# Patient Record
Sex: MALE | Race: WHITE | Employment: OTHER | URBAN - METROPOLITAN AREA
[De-identification: names, ages, dates, MRNs, and addresses within clinical notes are randomized per-mention and may not be internally consistent; named-entity substitution may affect disease eponyms.]

---

## 2025-07-31 ENCOUNTER — APPOINTMENT (OUTPATIENT)
Dept: RADIOLOGY | Facility: HOSPITAL | Age: 80
End: 2025-07-31
Payer: MEDICARE

## 2025-07-31 ENCOUNTER — HOSPITAL ENCOUNTER (OUTPATIENT)
Dept: CARDIOLOGY | Facility: HOSPITAL | Age: 80
Discharge: HOME | End: 2025-07-31
Payer: MEDICARE

## 2025-07-31 ENCOUNTER — HOSPITAL ENCOUNTER (EMERGENCY)
Facility: HOSPITAL | Age: 80
Discharge: HOME | End: 2025-07-31
Payer: MEDICARE

## 2025-07-31 ENCOUNTER — APPOINTMENT (OUTPATIENT)
Dept: CARDIOLOGY | Facility: HOSPITAL | Age: 80
End: 2025-07-31
Payer: MEDICARE

## 2025-07-31 VITALS
RESPIRATION RATE: 18 BRPM | SYSTOLIC BLOOD PRESSURE: 144 MMHG | DIASTOLIC BLOOD PRESSURE: 87 MMHG | WEIGHT: 200 LBS | OXYGEN SATURATION: 94 % | HEIGHT: 65 IN | TEMPERATURE: 97.7 F | HEART RATE: 61 BPM | BODY MASS INDEX: 33.32 KG/M2

## 2025-07-31 DIAGNOSIS — R07.9 CHEST PAIN: ICD-10-CM

## 2025-07-31 DIAGNOSIS — K42.9 UMBILICAL HERNIA WITHOUT OBSTRUCTION AND WITHOUT GANGRENE: ICD-10-CM

## 2025-07-31 DIAGNOSIS — M10.9: ICD-10-CM

## 2025-07-31 DIAGNOSIS — I50.32 CHRONIC DIASTOLIC CONGESTIVE HEART FAILURE: Primary | ICD-10-CM

## 2025-07-31 LAB
ALBUMIN SERPL BCP-MCNC: 4.3 G/DL (ref 3.4–5)
ALP SERPL-CCNC: 87 U/L (ref 33–136)
ALT SERPL W P-5'-P-CCNC: 11 U/L (ref 10–52)
ANION GAP SERPL CALC-SCNC: 14 MMOL/L (ref 10–20)
AST SERPL W P-5'-P-CCNC: 12 U/L (ref 9–39)
BASOPHILS # BLD AUTO: 0.05 X10*3/UL (ref 0–0.1)
BASOPHILS NFR BLD AUTO: 0.6 %
BILIRUB SERPL-MCNC: 1.1 MG/DL (ref 0–1.2)
BNP SERPL-MCNC: 276 PG/ML (ref 0–99)
BUN SERPL-MCNC: 26 MG/DL (ref 6–23)
CALCIUM SERPL-MCNC: 9.6 MG/DL (ref 8.6–10.3)
CARDIAC TROPONIN I PNL SERPL HS: 11 NG/L (ref 0–20)
CHLORIDE SERPL-SCNC: 105 MMOL/L (ref 98–107)
CO2 SERPL-SCNC: 25 MMOL/L (ref 21–32)
CREAT SERPL-MCNC: 1.99 MG/DL (ref 0.5–1.3)
EGFRCR SERPLBLD CKD-EPI 2021: 33 ML/MIN/1.73M*2
EOSINOPHIL # BLD AUTO: 0.16 X10*3/UL (ref 0–0.4)
EOSINOPHIL NFR BLD AUTO: 2 %
ERYTHROCYTE [DISTWIDTH] IN BLOOD BY AUTOMATED COUNT: 14 % (ref 11.5–14.5)
GLUCOSE SERPL-MCNC: 88 MG/DL (ref 74–99)
HCT VFR BLD AUTO: 38.9 % (ref 41–52)
HGB BLD-MCNC: 12.6 G/DL (ref 13.5–17.5)
IMM GRANULOCYTES # BLD AUTO: 0.03 X10*3/UL (ref 0–0.5)
IMM GRANULOCYTES NFR BLD AUTO: 0.4 % (ref 0–0.9)
LYMPHOCYTES # BLD AUTO: 1.04 X10*3/UL (ref 0.8–3)
LYMPHOCYTES NFR BLD AUTO: 13.1 %
MCH RBC QN AUTO: 27.6 PG (ref 26–34)
MCHC RBC AUTO-ENTMCNC: 32.4 G/DL (ref 32–36)
MCV RBC AUTO: 85 FL (ref 80–100)
MONOCYTES # BLD AUTO: 1.05 X10*3/UL (ref 0.05–0.8)
MONOCYTES NFR BLD AUTO: 13.3 %
NEUTROPHILS # BLD AUTO: 5.59 X10*3/UL (ref 1.6–5.5)
NEUTROPHILS NFR BLD AUTO: 70.6 %
NRBC BLD-RTO: 0 /100 WBCS (ref 0–0)
PLATELET # BLD AUTO: 262 X10*3/UL (ref 150–450)
POTASSIUM SERPL-SCNC: 4 MMOL/L (ref 3.5–5.3)
PROT SERPL-MCNC: 7.4 G/DL (ref 6.4–8.2)
RBC # BLD AUTO: 4.56 X10*6/UL (ref 4.5–5.9)
SODIUM SERPL-SCNC: 140 MMOL/L (ref 136–145)
WBC # BLD AUTO: 7.9 X10*3/UL (ref 4.4–11.3)

## 2025-07-31 PROCEDURE — 99285 EMERGENCY DEPT VISIT HI MDM: CPT

## 2025-07-31 PROCEDURE — 80053 COMPREHEN METABOLIC PANEL: CPT

## 2025-07-31 PROCEDURE — 73110 X-RAY EXAM OF WRIST: CPT | Mod: LT

## 2025-07-31 PROCEDURE — 71045 X-RAY EXAM CHEST 1 VIEW: CPT | Performed by: RADIOLOGY

## 2025-07-31 PROCEDURE — 84484 ASSAY OF TROPONIN QUANT: CPT

## 2025-07-31 PROCEDURE — 73110 X-RAY EXAM OF WRIST: CPT | Mod: LEFT SIDE | Performed by: RADIOLOGY

## 2025-07-31 PROCEDURE — 93005 ELECTROCARDIOGRAM TRACING: CPT

## 2025-07-31 PROCEDURE — 71045 X-RAY EXAM CHEST 1 VIEW: CPT

## 2025-07-31 PROCEDURE — 85025 COMPLETE CBC W/AUTO DIFF WBC: CPT

## 2025-07-31 PROCEDURE — 83880 ASSAY OF NATRIURETIC PEPTIDE: CPT

## 2025-07-31 PROCEDURE — 36415 COLL VENOUS BLD VENIPUNCTURE: CPT

## 2025-07-31 RX ORDER — COLCHICINE 0.6 MG/1
0.6 TABLET ORAL 2 TIMES DAILY
Qty: 14 TABLET | Refills: 0 | Status: SHIPPED | OUTPATIENT
Start: 2025-07-31 | End: 2025-07-31

## 2025-07-31 RX ORDER — METHYLPREDNISOLONE 4 MG/1
TABLET ORAL
Qty: 21 TABLET | Refills: 0 | Status: SHIPPED | OUTPATIENT
Start: 2025-07-31 | End: 2025-08-06

## 2025-07-31 RX ORDER — FUROSEMIDE 20 MG/1
20 TABLET ORAL 2 TIMES DAILY
Qty: 14 TABLET | Refills: 0 | Status: SHIPPED | OUTPATIENT
Start: 2025-07-31 | End: 2025-08-07

## 2025-07-31 RX ORDER — FUROSEMIDE 20 MG/1
20 TABLET ORAL 2 TIMES DAILY
Qty: 14 TABLET | Refills: 0 | Status: SHIPPED | OUTPATIENT
Start: 2025-07-31 | End: 2025-07-31

## 2025-07-31 RX ORDER — COLCHICINE 0.6 MG/1
0.6 TABLET ORAL 2 TIMES DAILY
Qty: 14 TABLET | Refills: 0 | Status: SHIPPED | OUTPATIENT
Start: 2025-07-31 | End: 2025-08-07

## 2025-07-31 RX ORDER — METHYLPREDNISOLONE 4 MG/1
TABLET ORAL
Qty: 21 TABLET | Refills: 0 | Status: SHIPPED | OUTPATIENT
Start: 2025-07-31 | End: 2025-07-31

## 2025-07-31 ASSESSMENT — LIFESTYLE VARIABLES
HAVE YOU EVER FELT YOU SHOULD CUT DOWN ON YOUR DRINKING: NO
EVER HAD A DRINK FIRST THING IN THE MORNING TO STEADY YOUR NERVES TO GET RID OF A HANGOVER: NO
HAVE PEOPLE ANNOYED YOU BY CRITICIZING YOUR DRINKING: NO
EVER FELT BAD OR GUILTY ABOUT YOUR DRINKING: NO
TOTAL SCORE: 0

## 2025-07-31 ASSESSMENT — PAIN DESCRIPTION - LOCATION: LOCATION: WRIST

## 2025-07-31 ASSESSMENT — PAIN - FUNCTIONAL ASSESSMENT: PAIN_FUNCTIONAL_ASSESSMENT: 0-10

## 2025-07-31 ASSESSMENT — PAIN SCALES - GENERAL: PAINLEVEL_OUTOF10: 6

## 2025-07-31 NOTE — ED TRIAGE NOTES
"Pt complains of \"funny feeling in chest\" no really pain.     Also complains of swelling and pain to left wrist area. Denies Injury  "

## 2025-08-01 LAB
HOLD SPECIMEN: NORMAL
Q ONSET: 212 MS
QRS COUNT: 10 BEATS
QRS DURATION: 90 MS
QT INTERVAL: 416 MS
QTC CALCULATION(BAZETT): 418 MS
QTC FREDERICIA: 418 MS
R AXIS: -15 DEGREES
T AXIS: 38 DEGREES
T OFFSET: 420 MS
VENTRICULAR RATE: 61 BPM

## 2025-08-01 NOTE — ED PROVIDER NOTES
Emergency Department Provider Note       History of Present Illness     History provided by: Patient  Limitations to History: None  External Records Reviewed with Brief Summary: None    HPI:  Brianna Hernandez is a 80 y.o. male with past medical history of CKD, atrial fibrillation, basal cell carcinoma, CAD, CHF, HTN, gout, JACQUE, presenting with stated chief concern for left wrist swelling.  Reports approximately 2 days of symptoms without clear provocation.  Denies fevers, chills or other infectious symptoms.  Does report history of gout but thinks he is not on any medications for it currently has not had recent flares.  Otherwise on review of systems is endorsing shortness of breath with exertion for the last several months.  Reports struggling with going up stairs.  Reports chronic lower extremity swelling as well over similar duration and does endorse history of heart failure.  Unsure what medications he is taking but family thinks he is poorly compliant despite having medications at home.  Endorses no current chest pain, shortness of breath, nausea, vomiting, diaphoresis.    Physical Exam   Triage vitals:  T 36.5 °C (97.7 °F)  HR (!) 105  /79  RR 18  O2 95 % None (Room air)    Physical Exam  Constitutional:       Appearance: Normal appearance.   HENT:      Head: Normocephalic and atraumatic.     Eyes:      Extraocular Movements: Extraocular movements intact.       Cardiovascular:      Rate and Rhythm: Normal rate.   Pulmonary:      Effort: Pulmonary effort is normal.     Musculoskeletal:      Cervical back: Normal range of motion.      Comments: Range of motion of left wrist intact without pain.  Significant swelling overlying left wrist.  Appreciably warm but not erythematous and only slightly warmer than contralateral.  No extension no swelling erythema up arm.     Skin:     General: Skin is warm and dry.     Neurological:      General: No focal deficit present.      Mental Status: He is alert and  oriented to person, place, and time.     Psychiatric:         Mood and Affect: Mood normal.         Behavior: Behavior normal.           Medical Decision Making & ED Course   Medical Decision Makin y.o. male with acute concern for left wrist swelling.  Physical exam most consistent with gout.  Septic arthritis considered as well but not appreciably warmer than contralateral, range of motion intact, no systemic infectious symptoms, afebrile and has history of gout.  X-rays obtained for further evaluation and determine if there is effusion amenable to arthrocentesis.  Otherwise with complaint of shortness of breath and is description worse consistent with chronic CHF exacerbation.  Cardiac labs, EKG, chest x-ray ordered.  ----      Differential diagnoses considered include but are not limited to: Gout, septic arthritis, traumatic wrist injury, fracture, CHF exacerbation, ACS    Social Determinants of Health which Significantly Impact Care: Social Determinants of Health which Significantly Impact Care: None identified     EKG Independent Interpretation: EKG shows a normal rate of 61bpm, atrial fibrillation, normal axis, QRS 90 ms, QTc 418ms. Normal ST and T wave pattern with no evidence of acute ischemia or other acute findings.    Independent Result Review and Interpretation: Relevant laboratory and radiographic results were reviewed and independently interpreted by myself.  As necessary, they are commented on in the ED Course.    Chronic conditions affecting the patient's care: As documented above in [facility]    The patient was discussed with the following consultants/services: None    Care Considerations: As documented above in [facility]    ED Course:  Diagnoses as of 25   Chronic diastolic congestive heart failure   Gouty arthritis of left wrist   Umbilical hernia without obstruction and without gangrene       Disposition   Patient was signed out to Dr. Looney at 9PM pending completion of their work-up.   Please see the next provider's transition of care note for the remainder of the patient's care.     Procedures   Procedures    Patient was seen independently    Ranjan Skaggs  Emergency Medicine                                                       Ranjan Skaggs  08/03/25 1002

## 2025-08-01 NOTE — DISCHARGE INSTRUCTIONS
You were seen today for wrist pain as well as shortness of breath.  You do appear to have gout of your left wrist.  Additionally, you may be experiencing or shortness of breath due to your CHF.  You appear to previously been on furosemide 20 mg a day.  I think reasonable to increase your dose to twice a day for 1 week.  If you have any worsening shortness of breath, feel please feel free to return here to the emergency department for further treatment and evaluation.

## 2025-08-01 NOTE — PROGRESS NOTES
Emergency Department Transition of Care Note       Signout   I received Brianna Hernandez in signout from Dr. Skaggs.  Please see the ED Provider Note for all HPI, PE and MDM up to the time of signout at 2100.  This is in addition to the primary record.    In brief Brianna Hernandez is an 80 y.o. male presenting for exertional dyspnea and left wrist pain    At the time of signout we were awaiting:  Return x-rays, completion of labs and disposition    ED Course & Medical Decision Making   Medical Decision Making:  Under my care, patient's labs are read, he does have a mild elevation in BNP but lower than previous.  Given this we will increase outpatient diuretic given the fact patient does not have an increased oxygen requirement.  Additionally, x-ray of wrist demonstrates likely gouty arthritis, given has range of motion and no fever, low concern for septic arthritis.  Will treat with Medrol Dosepak as well as colchicine.  Will recommend follow-up with PCP in 1 to 2 weeks.  All questions were answered prior to discharge, return precaution provided.  Additionally, patient did request referral to general surgery as he does have an umbilical hernia that he would like to have repaired.  Will provide referral at this time    ED Course:  Diagnoses as of 08/01/25 0344   Chronic diastolic congestive heart failure   Gouty arthritis of left wrist   Umbilical hernia without obstruction and without gangrene       Disposition   As a result of the work-up, the patient was discharged home.  he was informed of his diagnosis and instructed to come back with any concerns or worsening of condition.  he and was agreeable to the plan as discussed above.  he was given the opportunity to ask questions.  All of the patient's questions were answered.    Procedures   Procedures    Patient was seen independently    Sudeep Looney MD  Emergency Medicine

## 2025-08-04 ENCOUNTER — APPOINTMENT (OUTPATIENT)
Dept: SURGERY | Facility: CLINIC | Age: 80
End: 2025-08-04
Payer: MEDICARE

## 2025-08-05 LAB
Q ONSET: 212 MS
QRS COUNT: 10 BEATS
QRS DURATION: 90 MS
QT INTERVAL: 416 MS
QTC CALCULATION(BAZETT): 418 MS
QTC FREDERICIA: 418 MS
R AXIS: -15 DEGREES
T AXIS: 38 DEGREES
T OFFSET: 420 MS
VENTRICULAR RATE: 61 BPM

## 2025-08-14 PROCEDURE — 93005 ELECTROCARDIOGRAM TRACING: CPT

## 2025-08-16 LAB
ATRIAL RATE: 0 BPM
PR INTERVAL: 196 MS
Q ONSET: 249 MS
QRS COUNT: 11 BEATS
QRS DURATION: 102 MS
QT INTERVAL: 431 MS
QTC CALCULATION(BAZETT): 445 MS
QTC FREDERICIA: 440 MS
R AXIS: 21 DEGREES
T AXIS: 51 DEGREES
T OFFSET: 465 MS
VENTRICULAR RATE: 64 BPM